# Patient Record
Sex: MALE | Race: BLACK OR AFRICAN AMERICAN | NOT HISPANIC OR LATINO | Employment: UNEMPLOYED | URBAN - METROPOLITAN AREA
[De-identification: names, ages, dates, MRNs, and addresses within clinical notes are randomized per-mention and may not be internally consistent; named-entity substitution may affect disease eponyms.]

---

## 2023-09-07 ENCOUNTER — NURSE TRIAGE (OUTPATIENT)
Dept: ADMINISTRATIVE | Facility: CLINIC | Age: 48
End: 2023-09-07

## 2023-09-08 NOTE — TELEPHONE ENCOUNTER
Was seen in ED 3-4 days ago and was given medication for back pain. Back pain is not much better and has worsened and is weak with nausea and vomiting, diarrhea and back spasm.  Reason for Disposition   [1] Loss of bladder or bowel control (urine or bowel incontinence; wetting self, leaking stool) AND [2] new-onset    Additional Information   Negative: Passed out (i.e., lost consciousness, collapsed and was not responding)   Negative: Shock suspected (e.g., cold/pale/clammy skin, too weak to stand, low BP, rapid pulse)   Negative: Sounds like a life-threatening emergency to the triager   Negative: Major injury to the back (e.g., MVA, fall > 10 feet or 3 meters, penetrating injury, etc.)   Negative: Followed a tailbone injury   Negative: [1] Pain in the upper back over the ribs (rib cage) AND [2] radiates (travels, goes) into chest   Negative: [1] Pain in the upper back over the ribs (rib cage) AND [2] worsened by coughing (or clearly increases with breathing)   Negative: Back pain during pregnancy   Negative: Pain mainly in flank (i.e., in the side, over the lower ribs or just below the ribs)   Negative: [1] SEVERE back pain (e.g., excruciating) AND [2] sudden onset AND [3] age > 60 years   Negative: [1] Unable to urinate (or only a few drops) > 4 hours AND [2] bladder feels very full (e.g., palpable bladder or strong urge to urinate)    Protocols used: Back Pain-A-

## 2024-10-25 ENCOUNTER — PATIENT MESSAGE (OUTPATIENT)
Dept: RESEARCH | Facility: HOSPITAL | Age: 49
End: 2024-10-25

## 2025-05-29 ENCOUNTER — PATIENT OUTREACH (OUTPATIENT)
Facility: OTHER | Age: 50
End: 2025-05-29

## 2025-05-29 NOTE — PROGRESS NOTES
Franchesca Bates, Patient Care Assistant  ED Navigator  Emergency Department    Project: [facility] ED Navigator  Role: Community Health Worker    Date: 05/29/2025  Patient Name: Rigoberto Osuna  MRN: [id number]  PCP: No, Primary Doctor    Assessment:     Rigoberto Osuna is a 50 y.o. male who has presented to ED for generalized body aches. Patient has visited the ED 5 times in the past 3 months. Patient does not have a PCP.     ED Navigator Initial Assessment    ED Navigator Enrollment Documentation  Consent to Services  Does patient consent to completing the assessment?: Yes  Contact  Method of Initial Contact: Phone  Transportation  Insurance Coverage  Do you have coverage/adequate coverage?: Yes  Specialist Appointment  Did the patient come to the ED to see a specialist?: No  Does the patient have a pending specialist referral?: No  Does the patient have a specialist appointment made?: No  PCP Follow Up Appointment  Medications  Is patient able to afford medication?: Yes  Psychological  Food  Communication/Education  Other Financial Concerns  Other Social Barriers/Concerns  Primary Barrier  Plan: Provided information for [facility] 24/7 Nurse triage line, [phone] or 1-[facility] ([phone])         Social History     Socioeconomic History    Marital status: Single    Number of children: 4   Tobacco Use    Smoking status: Every Day     Current packs/day: 1.00     Average packs/day: 1 pack/day for 24.0 years (24.0 ttl pk-yrs)     Types: Cigarettes    Smokeless tobacco: Never   Substance and Sexual Activity    Alcohol use: Not Currently     Alcohol/week: 11.0 - 12.0 standard drinks of alcohol     Types: 10 Cans of beer, 1 - 2 Shots of liquor per week     Comment: Beer 2 cases a day,  but now 5-6 a day, quit x years    Drug use: Yes     Frequency: 70.0 times per week     Types: Marijuana     Comment: Lortab (not prescribed)    Sexual activity: Yes     Partners: Female   Other Topics Concern    Patient feels  they ought to cut down on drinking/drug use Yes    Patient annoyed by others criticizing their drinking/drug use Yes    Patient has felt bad or guilty about drinking/drug use No    Patient has had a drink/used drugs as an eye opener in the AM No     Social Drivers of Health     Financial Resource Strain: Low Risk  (5/29/2025)    Overall Financial Resource Strain (CARDIA)     Difficulty of Paying Living Expenses: Not hard at all   Food Insecurity: No Food Insecurity (5/29/2025)    Hunger Vital Sign     Worried About Running Out of Food in the Last Year: Never true     Ran Out of Food in the Last Year: Never true   Transportation Needs: No Transportation Needs (5/29/2025)    PRAPARE - Transportation     Lack of Transportation (Medical): No     Lack of Transportation (Non-Medical): No   Physical Activity: Inactive (5/29/2025)    Exercise Vital Sign     Days of Exercise per Week: 0 days     Minutes of Exercise per Session: 0 min   Stress: No Stress Concern Present (5/29/2025)    [employer] - Occupational Stress Questionnaire     Feeling of Stress : Not at all   Housing Stability: Low Risk  (5/29/2025)    Housing Stability Vital Sign     Unable to Pay for Housing in the Last Year: No     Homeless in the Last Year: No       Plan:       Pt is doing a little better. Pt does not have a PCP, and agreed to assistance to find one, and schedule an appt. Pt agreed to being scheduled @ [facility]. ED navigator scheduled pt with Laya Gonzalez NP for 6/3/2025 @ 9:00 a.m. Pt expressed everything else is fine and he did not need any resources. Pt agreed to [facility] tech support number being e-mailed to him, as he has not been active for 3 years, in case he would ever like to use it, being he is scheduled with an [facility] provider. ED navigator ensured patient had no other needs at this time. ED navigator provided patient with the following via e-mail: his appointment details, the [facility] tech support  number, the [facility] 24/7 Nurse triage line, [phone] or 1-866-[facility] ([phone]) contact information, and education on ([facility] Right [facility] information, [facility] Virtual [facility] information, and Heart healthy diet tips). ED navigator reminded patient to reach out if there is ever anything she can assist with. ED navigator plans to follow-up with patient on/around 6/2/2025.    Franchesca Bates  ED Navigator  [phone]

## 2025-06-02 ENCOUNTER — PATIENT OUTREACH (OUTPATIENT)
Facility: OTHER | Age: 50
End: 2025-06-02

## 2025-06-11 ENCOUNTER — PATIENT OUTREACH (OUTPATIENT)
Facility: OTHER | Age: 50
End: 2025-06-11

## 2025-06-12 NOTE — PROGRESS NOTES
Pt is doing well. Pt expressed Laya Gonzalez, NP is the best provider he has seen yet. Pt did not see My[facility] tech support number e-mailed to him; it was forwarded again to him today, so he can have access to it again. ED navigator ensured patient had no other needs at this time. ED navigator reminded patient to reach out if there is ever anything she can assist with. ED navigator plans to follow-up with patient on/around 6/20/2025.    Franchesca Bates  ED Navigator  [phone]

## 2025-06-27 ENCOUNTER — PATIENT OUTREACH (OUTPATIENT)
Facility: OTHER | Age: 50
End: 2025-06-27

## 2025-06-27 NOTE — PROGRESS NOTES
Pt is doing good. Pt expressed he forgot to check if he received the MyChart tech support number e-mailed to him. ED navigator ensured there was nothing she could help patient with today. ED navigator plans to follow-up with patient on/around 7/14/2025.    Franchesca Bates  ED Navigator  [phone]

## 2025-07-23 ENCOUNTER — PATIENT OUTREACH (OUTPATIENT)
Facility: OTHER | Age: 50
End: 2025-07-23

## 2025-07-23 NOTE — PROGRESS NOTES
Pt did not check e-mail again. The tech support number was e-mailed to pt again. Educated on importance of [facility]rita and how it could benefit him. ED navigator ensured patient had no other needs at this time. ED navigator reminded patient to reach out if there is ever anything she can assist with. ED navigator plans to follow-up with patient on/around 8/1/2025.    Franchesca Bates  ED Navigator  [phone]

## 2025-08-08 ENCOUNTER — PATIENT OUTREACH (OUTPATIENT)
Facility: OTHER | Age: 50
End: 2025-08-08

## 2025-08-08 NOTE — PROGRESS NOTES
Pt is unreachable for follow-up call. ED navigator plans to follow-up with patient again on/around 9/30/2025.    Franchesca Bates  ED Navigator  [phone]